# Patient Record
(demographics unavailable — no encounter records)

---

## 2024-10-07 NOTE — IMAGING
[de-identified] : Right Knee Exam: Inspection: Varus deformity, mild effusion, no warmth, no ecchymosis Palpation: Medial joint line tenderness to palpation, Negative Donte Range of motion: 0-120 with associated crepitus Strength: 5/5 quadriceps and hamstring strength Special testing: Ligamentously stable Motor and sensory intact distally Gait: Non antalgic gait

## 2024-10-07 NOTE — ASSESSMENT
[FreeTextEntry1] : Right knee orthovisc injection #2 today - tolerated well  Discussed post procedure recommendations  RTC 1 week to continue   I am working today under the supervision of Dr. Russell and I am following the plan of care of Dr. Russell as described by him on this date.  Progress note completed by Bettie Douglas PA-C under the supervision of Dr. Russell.

## 2024-10-07 NOTE — PROCEDURE
[FreeTextEntry3] : Large joint injection was performed of the right knee.  The indication for this procedure was pain, inflammation and x-ray evidence of Osteoarthritis on this or prior visit. The site was prepped with alcohol, betadine, ethyl chloride sprayed topically and sterile technique used.  An injection of ORTHOVISC 2ml, series #2 was used.   Patient was advised to call if redness, pain or fever occur, apply ice for 15 minutes out of every hour for the next 12-24 hours as tolerated and patient was advised to rest the joint(s) for 2 days. Patient has tried OTC's including aspirin, Ibuprofen, Aleve, etc or prescription NSAIDS, and/or exercises at home and/or physical therapy without satisfactory response, patient had decreased mobility in the joint and the risks benefits, and alternatives have been discussed, and verbal consent was obtained.  Ultrasound guidance was indicated for this patient due to obesity. All ultrasound images have been permanently captured and stored accordingly in our picture archiving and communication system. Visualization of the needle and placement of injection was performed without complication.

## 2024-10-07 NOTE — HISTORY OF PRESENT ILLNESS
[10] : 10 [Localized] : localized [Intermittent] : intermittent [Household chores] : household chores [Rest] : rest [Injection therapy] : injection therapy [Walking] : walking [Stairs] : stairs [] : yes [de-identified] : 3/4/24: HERE FOR FOLLOW UP R KNEE. HE HAS DONE ORTHOVISC IN THE PAST WITH SOME RELIEF. INQUIRING ABOUT REPEATING THE SERIES TODAY   03/11/24 right knee orthovisc # 2  3/18/24: here for right knee Orthovisc #3.   3/25/24: here for right knee Orthovisc #4.   9.9.24 Here for right knee pain. Requesting cortisone injection  9.30.24 patient here for right knee pain.   10/7/24: Here for right knee and orthovisc #2  [FreeTextEntry1] : RIGHT KNEE  [FreeTextEntry5] : 03/04/24 FOLLOW UP RIGHT KNEE HAS TRY ORTHOVISC WITH RELIEF WILL START THAT TODAY AGAIN  [de-identified] : ORTHOVISC

## 2024-10-09 NOTE — PLAN
[FreeTextEntry1] : Check routine labs as above. Not fasting today so provided lab slip.  Continue diet / exercise and weight loss efforts. Flu shot and Prevnar-20 administered today.  Hypertension - BP is controlled. Continue amlodipine, Torsemide, and spironolactone. Followed with cardiology. Hypothyroidism - check TSH and continue levothyroxine. Check cholesterol and continue simvastatin and fenofibrate.  Check hemoglobin A1c today. Continue Toujeo, Mounjaro, and Glimepiride. Management per endocrine.  Continue PPI for GERD. Check uric acid level, continue Uloric for gout. Prednisone PRN for flares - refilled. CKD - check renal function. Followed with nephrology. Check vitamin D level - continue vitamin D3 2000U daily.  RTO 6 months fasting.

## 2024-10-09 NOTE — HEALTH RISK ASSESSMENT
[Excellent] : ~his/her~  mood as  excellent [No] : In the past 12 months have you used drugs other than those required for medical reasons? No [No falls in past year] : Patient reported no falls in the past year [0] : 2) Feeling down, depressed, or hopeless: Not at all (0) [PHQ-2 Negative - No further assessment needed] : PHQ-2 Negative - No further assessment needed [Never] : Never [Patient declined colonoscopy] : Patient declined colonoscopy [None] : None [With Family] : lives with family [Employed] : employed [] :  [# Of Children ___] : has [unfilled] children [Sexually Active] : sexually active [Smoke Detector] : smoke detector [Carbon Monoxide Detector] : carbon monoxide detector [Seat Belt] :  uses seat belt [Sunscreen] : uses sunscreen [High Risk Behavior] : no high risk behavior [Reports changes in hearing] : Reports no changes in hearing [Reports changes in vision] : Reports no changes in vision [Reports changes in dental health] : Reports no changes in dental health [ColonoscopyComments] : FIT negative 3/20

## 2024-10-09 NOTE — HISTORY OF PRESENT ILLNESS
[FreeTextEntry1] : physical [de-identified] : Patient comes for an annual exam.  He reports feeling well today. No major complaints. Compliant with medications.

## 2024-10-09 NOTE — PHYSICAL EXAM
[No Acute Distress] : no acute distress [Well Nourished] : well nourished [Well Developed] : well developed [Well-Appearing] : well-appearing [Normal Sclera/Conjunctiva] : normal sclera/conjunctiva [PERRL] : pupils equal round and reactive to light [EOMI] : extraocular movements intact [Normal Oropharynx] : the oropharynx was normal [Normal TMs] : both tympanic membranes were normal [Supple] : supple [No Lymphadenopathy] : no lymphadenopathy [Clear to Auscultation] : lungs were clear to auscultation bilaterally [Normal Rate] : normal rate  [Regular Rhythm] : with a regular rhythm [No Carotid Bruits] : no carotid bruits [Normal Appearance] : normal in appearance [Soft] : abdomen soft [Non Tender] : non-tender [Non-distended] : non-distended [Normal Bowel Sounds] : normal bowel sounds [No Joint Swelling] : no joint swelling [Grossly Normal Strength/Tone] : grossly normal strength/tone [No Rash] : no rash [Normal Gait] : normal gait [Normal Mood] : the mood was normal [Normal Affect] : the affect was normal [de-identified] : friendly  [de-identified] : +hearing aids, no cerumen [de-identified] : 1+ BLE edema, compression socks  [de-identified] : obese abdomen  [de-identified] : numerous scattered skin tags around neck

## 2024-10-09 NOTE — REVIEW OF SYSTEMS
[Fatigue] : fatigue [Nocturia] : nocturia [Negative] : Heme/Lymph [Recent Change In Weight] : ~T no recent weight change [FreeTextEntry8] : nocturia 2-3+

## 2024-10-14 NOTE — PROCEDURE
[FreeTextEntry3] : Large joint injection was performed of the right knee.  The indication for this procedure was pain, inflammation and x-ray evidence of Osteoarthritis on this or prior visit. The site was prepped with alcohol, betadine, ethyl chloride sprayed topically and sterile technique used.  An injection of ORTHOVISC 2ml, series #3 was used.   Patient was advised to call if redness, pain or fever occur, apply ice for 15 minutes out of every hour for the next 12-24 hours as tolerated and patient was advised to rest the joint(s) for 2 days. Patient has tried OTC's including aspirin, Ibuprofen, Aleve, etc or prescription NSAIDS, and/or exercises at home and/or physical therapy without satisfactory response, patient had decreased mobility in the joint and the risks benefits, and alternatives have been discussed, and verbal consent was obtained.  Ultrasound guidance was indicated for this patient due to obesity. All ultrasound images have been permanently captured and stored accordingly in our picture archiving and communication system. Visualization of the needle and placement of injection was performed without complication.

## 2024-10-14 NOTE — IMAGING
[de-identified] : Right Knee Exam: Inspection: Varus deformity, mild effusion, no warmth, no ecchymosis Palpation: Medial joint line tenderness to palpation, Negative Donte Range of motion: 0-120 with associated crepitus Strength: 5/5 quadriceps and hamstring strength Special testing: Ligamentously stable Motor and sensory intact distally Gait: Non antalgic gait

## 2024-10-14 NOTE — HISTORY OF PRESENT ILLNESS
[de-identified] : 3/4/24: HERE FOR FOLLOW UP R KNEE. HE HAS DONE ORTHOVISC IN THE PAST WITH SOME RELIEF. INQUIRING ABOUT REPEATING THE SERIES TODAY   03/11/24 right knee orthovisc # 2  3/18/24: here for right knee Orthovisc #3.   3/25/24: here for right knee Orthovisc #4.   9.9.24 Here for right knee pain. Requesting cortisone injection  9.30.24 patient here for right knee pain.   10/7/24: Here for right knee and orthovisc #2   10/14/24: Here for right knee and orthovisc #3.  [10] : 10 [Localized] : localized [Intermittent] : intermittent [Household chores] : household chores [Rest] : rest [Injection therapy] : injection therapy [Walking] : walking [Stairs] : stairs [] : yes [FreeTextEntry1] : RIGHT KNEE  [FreeTextEntry5] : 03/04/24 FOLLOW UP RIGHT KNEE HAS TRY ORTHOVISC WITH RELIEF WILL START THAT TODAY AGAIN  [de-identified] : ORTHOVISC

## 2024-10-14 NOTE — ASSESSMENT
[FreeTextEntry1] : Right knee orthovisc injection #3 today - tolerated well  RTC 1 week to continue   I am working today under the supervision of Dr. Russell and I am following the plan of care of Dr. Russell as described by him on this date.  Progress note completed by Bettie Douglas PA-C under the supervision of Dr. Russell.

## 2024-10-21 NOTE — IMAGING
[de-identified] : Right Knee Exam: Inspection: Varus deformity, mild effusion, no warmth, no ecchymosis Palpation: Medial joint line tenderness to palpation, Negative Donte Range of motion: 0-120 with associated crepitus Strength: 5/5 quadriceps and hamstring strength Special testing: Ligamentously stable Motor and sensory intact distally Gait: Non antalgic gait

## 2024-10-21 NOTE — ASSESSMENT
[FreeTextEntry1] : Right knee orthovisc injection #4 today - tolerated well  RTC prn  Discussed timing of repeat injections   I am working today under the supervision of Dr. Russell and I am following the plan of care of Dr. Russell as described by him on this date. Progress note completed by Kate Quiroz PA-C under the supervision of Dr. Russell.

## 2024-10-21 NOTE — HISTORY OF PRESENT ILLNESS
[10] : 10 [Localized] : localized [Intermittent] : intermittent [Household chores] : household chores [Rest] : rest [Injection therapy] : injection therapy [Walking] : walking [Stairs] : stairs [] : yes [de-identified] : 3/4/24: HERE FOR FOLLOW UP R KNEE. HE HAS DONE ORTHOVISC IN THE PAST WITH SOME RELIEF. INQUIRING ABOUT REPEATING THE SERIES TODAY   03/11/24 right knee orthovisc # 2  3/18/24: here for right knee Orthovisc #3.   3/25/24: here for right knee Orthovisc #4.   9.9.24 Here for right knee pain. Requesting cortisone injection  9.30.24 patient here for right knee pain.   10/7/24: Here for right knee and orthovisc #2   10/14/24: Here for right knee and orthovisc #3.   10.21.24 here for right knee pain. orthovisc #4 [FreeTextEntry1] : RIGHT KNEE  [FreeTextEntry5] : 03/04/24 FOLLOW UP RIGHT KNEE HAS TRY ORTHOVISC WITH RELIEF WILL START THAT TODAY AGAIN  [de-identified] : ORTHOVISC

## 2024-10-21 NOTE — PROCEDURE
[FreeTextEntry3] : Large joint injection was performed of the right knee.  The indication for this procedure was pain, inflammation and x-ray evidence of Osteoarthritis on this or prior visit. The site was prepped with alcohol, betadine, ethyl chloride sprayed topically and sterile technique used.  An injection of ORTHOVISC 2ml, series #4 was used.   Patient was advised to call if redness, pain or fever occur, apply ice for 15 minutes out of every hour for the next 12-24 hours as tolerated and patient was advised to rest the joint(s) for 2 days. Patient has tried OTC's including aspirin, Ibuprofen, Aleve, etc or prescription NSAIDS, and/or exercises at home and/or physical therapy without satisfactory response, patient had decreased mobility in the joint and the risks benefits, and alternatives have been discussed, and verbal consent was obtained.  Ultrasound guidance was indicated for this patient due to obesity. All ultrasound images have been permanently captured and stored accordingly in our picture archiving and communication system. Visualization of the needle and placement of injection was performed without complication.

## 2024-11-05 NOTE — ASSESSMENT
[FreeTextEntry1] : XRays reviewed- GT/ proximal humerus fracture.  No fractures elbow. He had set back at PT but will continue. PT for PROM, AROM as tolerated. Ok for strengthening as tolerated. Add balance/gait training.  RTO 2 months with xrays.

## 2024-11-05 NOTE — HISTORY OF PRESENT ILLNESS
[8] : 8 [0] : 0 [Radiating] : radiating [Sharp] : sharp [de-identified] : 11/5/24: Here for follow up. He states he had increased pain and weakness after a massage at PT.  9/24/24: Here for follow up.  He did PT once.  He has been using the arm and returning to more activities.  He's been seeing Dr. Baez for b/l knees, and has done some gait training with PT in the pa[place].    8/20/24: Here for 4 week follow up. He states he fell onto his stomach and is concerned he re-injured arm. He is in sling.   8/6/24: Here for follow up, 2.5 weeks. He is in sling. He has less pain in shoulder. There is still pain in elbow.  7/23/24: 65 yo RHD male with left shoulder pain since 7/19/24. He states he tripped and fell onto his left arm. He has pain and bruising to his elbow. His shoulder is painful and hes unable to raise his arm. He went to [facility] ED and had xrays. He is taking Oxycodone 5mg.  PMHx: HTN, DM, CA [] : no [FreeTextEntry1] : LT shoulder and elbow [FreeTextEntry5] : Follow up for LT shoulder and elbow pain which comes and goes. Patient was doing well until PT last week she was massaging his shoulder and he believes she hit scar tissue and his arm went limp.  He now lost his mobility. He has been in terrible pain. Elbow pain only happens when he overuses it.  [FreeTextEntry7] : elbow [de-identified] : PT

## 2024-11-05 NOTE — PHYSICAL EXAM
[Left] : left shoulder [5 ___] : forward flexion 5[unfilled]/5 [5___] : external rotation 5[unfilled]/5 [] : strength is improving [FreeTextEntry9] : FE: 140 ER: 45

## 2024-12-11 NOTE — PLAN
[FreeTextEntry1] : Hypertension - BP controlled. Continue amlodipine, torsemide, and spironolactone.  Hypothyroidism - TSH controlled, continue Levothyroxine.  HLD - continue Simvastatin and Fenofibrate. LDL at goal. DM - A1c controlled, continue Mounjaro, Toujeo and Glimepiride for DM. Management per endocrine.  Annual [employer] physical form completed and given to pt.  RTO 4 months fasting.

## 2024-12-11 NOTE — REVIEW OF SYSTEMS
[Joint Pain] : joint pain [Anxiety] : anxiety [Depression] : depression [Negative] : Heme/Lymph [Recent Change In Weight] : ~T no recent weight change [Suicidal] : not suicidal [Insomnia] : no insomnia

## 2024-12-11 NOTE — PHYSICAL EXAM
[No Acute Distress] : no acute distress [Well Nourished] : well nourished [Well Developed] : well developed [Well-Appearing] : well-appearing [Normal Sclera/Conjunctiva] : normal sclera/conjunctiva [PERRL] : pupils equal round and reactive to light [EOMI] : extraocular movements intact [Normal Oropharynx] : the oropharynx was normal [Normal TMs] : both tympanic membranes were normal [Supple] : supple [No Lymphadenopathy] : no lymphadenopathy [Clear to Auscultation] : lungs were clear to auscultation bilaterally [Normal Rate] : normal rate  [Regular Rhythm] : with a regular rhythm [No Carotid Bruits] : no carotid bruits [Soft] : abdomen soft [Non Tender] : non-tender [Non-distended] : non-distended [Normal Bowel Sounds] : normal bowel sounds [No Joint Swelling] : no joint swelling [Grossly Normal Strength/Tone] : grossly normal strength/tone [No Rash] : no rash [Normal Gait] : normal gait [Normal Affect] : the affect was normal [Normal Mood] : the mood was normal [de-identified] : friendly  [de-identified] : wearing compression socks [de-identified] : obese abdomen

## 2024-12-11 NOTE — HISTORY OF PRESENT ILLNESS
[Spouse] : spouse [FreeTextEntry1] : HTN, HLD, DM, hypothyroidism [de-identified] : Patient comes for follow up.  He reports feeling well. No new complaints. He needs annual  physical exam form completed. Labs were controlled in 10/24. He is compliant with medications.

## 2025-01-07 NOTE — PHYSICAL EXAM
[Left] : left shoulder [5 ___] : forward flexion 5[unfilled]/5 [5___] : external rotation 5[unfilled]/5 [] : strength is improving [FreeTextEntry9] : FE: 150 ER: 45

## 2025-01-07 NOTE — IMAGING
[Left] : left shoulder [The fracture is in acceptable alignment. There is progression in healing seen] : The fracture is in acceptable alignment. There is progression in healing seen [FreeTextEntry1] : fracture is healed

## 2025-01-07 NOTE — ASSESSMENT
[FreeTextEntry1] : XRays reviewed- GT/ proximal humerus fracture.   fracture is healed  He had set back at PT but will continue. new rx given today  PT for PROM, AROM as tolerated. Ok for strengthening as tolerated. Add balance/gait training.  RTO prn
FREE:[LAST:[Armando],FIRST:[Mark],PHONE:[(   )    -],FAX:[(   )    -]]

## 2025-01-07 NOTE — HISTORY OF PRESENT ILLNESS
[8] : 8 [0] : 0 [Radiating] : radiating [Sharp] : sharp [de-identified] : 1/7/25: here to follow up on left shoulder.  He is now 5 + months s/p fracture/injury. PT ended and he has since reported worsening pain. He is starting back up with it today.  He feels he is lacking mostly strengthening.   11/5/24: Here for follow up. He states he had increased pain and weakness after a massage at PT.  9/24/24: Here for follow up.  He did PT once.  He has been using the arm and returning to more activities.  He's been seeing Dr. Baez for b/l knees, and has done some gait training with PT in the pa[place].    8/20/24: Here for 4 week follow up. He states he fell onto his stomach and is concerned he re-injured arm. He is in sling.   8/6/24: Here for follow up, 2.5 weeks. He is in sling. He has less pain in shoulder. There is still pain in elbow.  7/23/24: 65 yo RHD male with left shoulder pain since 7/19/24. He states he tripped and fell onto his left arm. He has pain and bruising to his elbow. His shoulder is painful and hes unable to raise his arm. He went to [facility] ED and had xrays. He is taking Oxycodone 5mg.  PMHx: HTN, DM, CA [] : no [FreeTextEntry1] : LT shoulder and elbow [FreeTextEntry5] : Follow up for LT shoulder and elbow pain which comes and goes. Patient was doing well until PT last week she was massaging his shoulder and he believes she hit scar tissue and his arm went limp.  He now lost his mobility. He has been in terrible pain. Elbow pain only happens when he overuses it.  [FreeTextEntry7] : elbow [de-identified] : PT

## 2025-04-09 NOTE — HISTORY OF PRESENT ILLNESS
[10] : 10 [7] : 7 [Dull/Aching] : dull/aching [Sharp] : sharp [Shooting] : shooting [Throbbing] : throbbing [de-identified] : 67M here with R knee folllow up. Continue pain. [FreeTextEntry1] : right knee

## 2025-04-09 NOTE — IMAGING
[de-identified] : Knee exam: Inspection: Varus deformity, mild effusion, no warmth, no ecchymosis Palpation: Medial joint line tenderness to palpation, negative Donte Range of motion: 0-100 with significant anterior crepitus Strength: 5/5 quadriceps and hamstring strength Stability: Ligamentously stable Motor and sensory intact distally Gait: Mildly antalgic gait

## 2025-04-16 NOTE — PLAN
[FreeTextEntry1] : Hypertension - BP controlled. Continue amlodipine, torsemide, and spironolactone.  Hypothyroidism - check TFTs, continue Levothyroxine.  HLD - check fasting lipids / chemistries, continue Simvastatin and Fenofibrate. DM - check A1c, continue Mounjaro, Toujeo and Glimepiride for DM. Management per endocrine. Lab slip provided as he is not fasting.  RTO 3-4 months fasting.

## 2025-04-16 NOTE — HISTORY OF PRESENT ILLNESS
[Spouse] : spouse [FreeTextEntry1] : HTN, HLD, DM, hypothyroidism [de-identified] : Patient comes for follow up.  He reports feeling well. No new complaints. He is compliant with medications.  He saw endocrine Marga Alfonso in 1/25. DM meds unchanged.

## 2025-04-16 NOTE — PHYSICAL EXAM
[No Acute Distress] : no acute distress [Well Nourished] : well nourished [Well Developed] : well developed [Well-Appearing] : well-appearing [Normal Sclera/Conjunctiva] : normal sclera/conjunctiva [PERRL] : pupils equal round and reactive to light [EOMI] : extraocular movements intact [Normal Oropharynx] : the oropharynx was normal [Normal TMs] : both tympanic membranes were normal [Supple] : supple [No Lymphadenopathy] : no lymphadenopathy [Clear to Auscultation] : lungs were clear to auscultation bilaterally [Normal Rate] : normal rate  [Regular Rhythm] : with a regular rhythm [No Carotid Bruits] : no carotid bruits [Soft] : abdomen soft [Non Tender] : non-tender [Non-distended] : non-distended [Normal Bowel Sounds] : normal bowel sounds [No Joint Swelling] : no joint swelling [Grossly Normal Strength/Tone] : grossly normal strength/tone [No Rash] : no rash [Normal Gait] : normal gait [Normal Affect] : the affect was normal [Normal Mood] : the mood was normal [de-identified] : friendly  [de-identified] : wearing compression socks [de-identified] : obese abdomen

## 2025-05-05 NOTE — ASSESSMENT
[FreeTextEntry1] : acute exacerbationof chronic R knee OA  Limited NSAIDs due to CKD The patient's current medication management of their orthopedic diagnosis was reviewed today: There is a moderate risk of morbidity with further treatment, especially from use of prescription strength medications and possible side effects of these medications which include upset stomach with oral medications, skin reactions to topical medications and cardiac/renal/diabetes issues with long term use. Decision is made at this point to proceed with Orthovisc injection series of 4 #1 administered today tolerated well he will follow-up each the next 3 weeks to complete the series

## 2025-05-05 NOTE — PROCEDURE
[FreeTextEntry3] : - Large joint injection was performed of the right knee.  - The indication for this procedure was pain and inflammation. Patient has tried OTC's including aspirin, Ibuprofen, Aleve, etc or prescription NSAIDS, and/or exercises at home and/or physical therapy without satisfactory response, patient had decreased mobility in the joint and the risks benefits, and alternatives have been discussed, and verbal consent was obtained. The site was prepped with alcohol, ethyl chloride sprayed topically and sterile technique used.  - An injection of Orthovisc #1 Right knee was injected. - Patient was advised to call if redness, pain or fever occur, apply ice for 15 minutes out of every hour for the next 12-24 hours as tolerated and patient was advised to rest the joint(s) for 2 days.  - Ultrasound guidance was indicated for this patient due to prior failure or difficult injection. All ultrasound images have been permanently captured and stored accordingly in our picture archiving and communication system. Visualization of the needle and placement of injection was performed without complication.

## 2025-05-05 NOTE — HISTORY OF PRESENT ILLNESS
[10] : 10 [7] : 7 [Dull/Aching] : dull/aching [Sharp] : sharp [Shooting] : shooting [Throbbing] : throbbing [de-identified] :  05/05/2025:  67M here with R knee folllow up. Continue pain. notes some improvement with asp/csi at last visit  [FreeTextEntry1] : right knee

## 2025-05-05 NOTE — IMAGING
[de-identified] : Knee exam: Inspection: Varus deformity, mild effusion, no warmth, no ecchymosis Palpation: Medial joint line tenderness to palpation, negative Donte Range of motion: 0-100 with significant anterior crepitus Strength: 5/5 quadriceps and hamstring strength Stability: Ligamentously stable Motor and sensory intact distally Gait: Mildly antalgic gait

## 2025-05-12 NOTE — ASSESSMENT
[FreeTextEntry1] : here for right knee orthovisc # 2 - tolerated procedure well - post procedure precautions discussed  use of cryotherapy discussed rtc in 1 week to continue series   I am working today under the supervision of Dr. Russell and I am following the plan of care of Dr. Russell as described by him on this date. Progress note completed by Kate Quiroz PA-C under the supervision of Dr. Russell.

## 2025-05-12 NOTE — IMAGING
[de-identified] : Knee exam: Inspection: Varus deformity, mild effusion, no warmth, no ecchymosis Palpation: Medial joint line tenderness to palpation, negative Donte Range of motion: 0-100 with significant anterior crepitus Strength: 5/5 quadriceps and hamstring strength Stability: Ligamentously stable Motor and sensory intact distally Gait: Mildly antalgic gait

## 2025-05-12 NOTE — HISTORY OF PRESENT ILLNESS
[10] : 10 [7] : 7 [Dull/Aching] : dull/aching [Sharp] : sharp [Shooting] : shooting [Throbbing] : throbbing [de-identified] :  05/05/2025:  67M here with R knee folllow up. Continue pain. notes some improvement with asp/csi at last visit   5.12.25 Patient here for right knee pain.  [FreeTextEntry1] : right knee

## 2025-05-22 NOTE — IMAGING
[de-identified] : Knee exam: Inspection: Varus deformity, mild effusion, no warmth, no ecchymosis Palpation: Medial joint line tenderness to palpation, negative Donte Range of motion: 0-100 with significant anterior crepitus Strength: 5/5 quadriceps and hamstring strength Stability: Ligamentously stable Motor and sensory intact distally Gait: Mildly antalgic gait

## 2025-05-22 NOTE — ASSESSMENT
[FreeTextEntry1] : here for right knee orthovisc # 3 - tolerated procedure well - post procedure precautions discussed  use of cryotherapy discussed rtc in 1 week to continue series   I am working today under the supervision of Dr. Russell and I am following the plan of care of Dr. Russell as described by him on this date. Progress note completed by Kate Quiroz PA-C under the supervision of Dr. Russell.

## 2025-05-22 NOTE — HISTORY OF PRESENT ILLNESS
[de-identified] : 5/22/25: patient is here for follow up of the right knee. Pain has been the same since last visit.

## 2025-05-27 NOTE — IMAGING
[de-identified] : Knee exam: Inspection: Varus deformity, mild effusion, no warmth, no ecchymosis Palpation: Medial joint line tenderness to palpation, negative Donte Range of motion: 0-100 with significant anterior crepitus Strength: 5/5 quadriceps and hamstring strength Stability: Ligamentously stable Motor and sensory intact distally Gait: Mildly antalgic gait

## 2025-05-27 NOTE — HISTORY OF PRESENT ILLNESS
[de-identified] : 05/27/2025 Orthovisc 4 5/22/25: patient is here for follow up of the right knee. Pain has been the same since last visit.

## 2025-05-27 NOTE — ASSESSMENT
[FreeTextEntry1] : here for right knee orthovisc # 4 - tolerated procedure well - post procedure precautions discussed  use of cryotherapy discussed rtc in 1 week to continue series   I am working today under the supervision of Dr. Russell and I am following the plan of care of Dr. Russell as described by him on this date. Progress note completed by Kate Quiroz PA-C under the supervision of Dr. Russell.

## 2025-06-18 NOTE — HISTORY OF PRESENT ILLNESS
[10] : 10 [Sharp] : sharp [Shooting] : shooting [Stabbing] : stabbing [Throbbing] : throbbing [] : yes

## 2025-06-18 NOTE — IMAGING
[de-identified] : Knee exam: Inspection: Varus deformity, mild effusion, no warmth, no ecchymosis Palpation: Medial joint line tenderness to palpation, negative Donte Range of motion: 0-100 with significant anterior crepitus

## 2025-06-18 NOTE — ASSESSMENT
[FreeTextEntry1] : No relief with the last round of viscosupplementation.  He reports intractable pain affecting his ADLs.  I have no other way to help him at this point.  Advised consultation with our total joint team.  Follow-up as needed with me.  -The patient's diagnosis was reviewed in detail. Explained this is a chronic, progressive deteriorating condition that may need treatment from time to time as the flare-ups occur. -The natural progression of Osteoarthritis was explained to the patient. We discussed the possible treatment options from conservative to operative. -We discussed prescription strength NSAIDs, Glucosamine and Chondroitin sulfate, and Physical Therapy as well different types of injections including viscosupplementation. -We also discussed that at some point the condition may progress and require a total knee replacement.